# Patient Record
Sex: FEMALE | Race: WHITE | NOT HISPANIC OR LATINO | Employment: OTHER | URBAN - METROPOLITAN AREA
[De-identification: names, ages, dates, MRNs, and addresses within clinical notes are randomized per-mention and may not be internally consistent; named-entity substitution may affect disease eponyms.]

---

## 2019-07-01 ENCOUNTER — TELEPHONE (OUTPATIENT)
Dept: GASTROENTEROLOGY | Facility: CLINIC | Age: 19
End: 2019-07-01

## 2019-07-01 NOTE — TELEPHONE ENCOUNTER
----- Message from Caren Gonzalez sent at 7/1/2019  9:12 AM CDT -----  Contact: Mom- Darline- [phone]  Pts mom called to schedule a np appt- states the pts feeding tube is leaking and needs to be changed- states her tube was placed by someone at [facility] in 2002- please contact Darline at [phone]

## 2019-07-19 ENCOUNTER — OFFICE VISIT (OUTPATIENT)
Dept: GASTROENTEROLOGY | Facility: CLINIC | Age: 19
End: 2019-07-19
Payer: COMMERCIAL

## 2019-07-19 VITALS
WEIGHT: 60 LBS | HEART RATE: 100 BPM | BODY MASS INDEX: 13.89 KG/M2 | HEIGHT: 55 IN | DIASTOLIC BLOOD PRESSURE: 73 MMHG | SYSTOLIC BLOOD PRESSURE: 114 MMHG

## 2019-07-19 DIAGNOSIS — Z93.1 PEG (PERCUTANEOUS ENDOSCOPIC GASTROSTOMY) STATUS: Primary | ICD-10-CM

## 2019-07-19 PROCEDURE — 3008F PR BODY MASS INDEX (BMI) DOCUMENTED: ICD-10-PCS | Mod: CPTII,S$GLB,, | Performed by: INTERNAL MEDICINE

## 2019-07-19 PROCEDURE — 99999 PR PBB SHADOW E&M-EST. PATIENT-LVL III: CPT | Mod: PBBFAC,,, | Performed by: INTERNAL MEDICINE

## 2019-07-19 PROCEDURE — 3008F BODY MASS INDEX DOCD: CPT | Mod: CPTII,S$GLB,, | Performed by: INTERNAL MEDICINE

## 2019-07-19 PROCEDURE — 99202 PR OFFICE/OUTPT VISIT, NEW, LEVL II, 15-29 MIN: ICD-10-PCS | Mod: S$GLB,,, | Performed by: INTERNAL MEDICINE

## 2019-07-19 PROCEDURE — 99202 OFFICE O/P NEW SF 15 MIN: CPT | Mod: S$GLB,,, | Performed by: INTERNAL MEDICINE

## 2019-07-19 PROCEDURE — 99999 PR PBB SHADOW E&M-EST. PATIENT-LVL III: ICD-10-PCS | Mod: PBBFAC,,, | Performed by: INTERNAL MEDICINE

## 2019-07-19 RX ORDER — BACLOFEN 10 MG/1
10 TABLET ORAL 4 TIMES DAILY
COMMUNITY
Start: 2019-05-14

## 2019-07-19 RX ORDER — CEPHALEXIN 250 MG/5ML
250 POWDER, FOR SUSPENSION ORAL EVERY 6 HOURS
Status: CANCELLED | OUTPATIENT
Start: 2019-07-19

## 2019-07-19 RX ORDER — LAMOTRIGINE 150 MG/1
TABLET ORAL 2 TIMES DAILY
COMMUNITY
Start: 2019-05-14
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
## 2021-03-11 ENCOUNTER — IMMUNIZATION (OUTPATIENT)
Dept: OBSTETRICS AND GYNECOLOGY | Facility: CLINIC | Age: 21
End: 2021-03-11
Payer: COMMERCIAL

## 2021-03-11 DIAGNOSIS — Z23 NEED FOR VACCINATION: Primary | ICD-10-CM

## 2021-03-11 PROCEDURE — 91300 COVID-19, MRNA, LNP-S, PF, 30 MCG/0.3 ML DOSE VACCINE: ICD-10-PCS | Mod: ,,, | Performed by: ANESTHESIOLOGY

## 2021-03-11 PROCEDURE — 0001A COVID-19, MRNA, LNP-S, PF, 30 MCG/0.3 ML DOSE VACCINE: ICD-10-PCS | Mod: CV19,,, | Performed by: ANESTHESIOLOGY

## 2021-03-11 PROCEDURE — 91300 COVID-19, MRNA, LNP-S, PF, 30 MCG/0.3 ML DOSE VACCINE: CPT | Mod: ,,, | Performed by: ANESTHESIOLOGY

## 2021-03-11 PROCEDURE — 0001A COVID-19, MRNA, LNP-S, PF, 30 MCG/0.3 ML DOSE VACCINE: CPT | Mod: CV19,,, | Performed by: ANESTHESIOLOGY

## 2021-04-01 ENCOUNTER — IMMUNIZATION (OUTPATIENT)
Dept: OBSTETRICS AND GYNECOLOGY | Facility: CLINIC | Age: 21
End: 2021-04-01
Payer: COMMERCIAL

## 2021-04-01 DIAGNOSIS — Z23 NEED FOR VACCINATION: Primary | ICD-10-CM

## 2021-04-01 PROCEDURE — 91300 COVID-19, MRNA, LNP-S, PF, 30 MCG/0.3 ML DOSE VACCINE: CPT | Mod: ,,, | Performed by: ANESTHESIOLOGY

## 2021-04-01 PROCEDURE — 0002A COVID-19, MRNA, LNP-S, PF, 30 MCG/0.3 ML DOSE VACCINE: ICD-10-PCS | Mod: CV19,,, | Performed by: ANESTHESIOLOGY

## 2021-04-01 PROCEDURE — 91300 COVID-19, MRNA, LNP-S, PF, 30 MCG/0.3 ML DOSE VACCINE: ICD-10-PCS | Mod: ,,, | Performed by: ANESTHESIOLOGY

## 2021-04-01 PROCEDURE — 0002A COVID-19, MRNA, LNP-S, PF, 30 MCG/0.3 ML DOSE VACCINE: CPT | Mod: CV19,,, | Performed by: ANESTHESIOLOGY

## 2021-12-10 ENCOUNTER — IMMUNIZATION (OUTPATIENT)
Dept: OBSTETRICS AND GYNECOLOGY | Facility: CLINIC | Age: 21
End: 2021-12-10
Payer: MEDICAID

## 2021-12-10 DIAGNOSIS — Z23 NEED FOR VACCINATION: Primary | ICD-10-CM

## 2021-12-10 PROCEDURE — 0004A COVID-19, MRNA, LNP-S, PF, 30 MCG/0.3 ML DOSE VACCINE: CPT | Mod: PBBFAC

## 2023-12-20 ENCOUNTER — HOSPITAL ENCOUNTER (EMERGENCY)
Facility: HOSPITAL | Age: 23
Discharge: HOME | End: 2023-12-20
Attending: EMERGENCY MEDICINE
Payer: MEDICAID

## 2023-12-20 VITALS
RESPIRATION RATE: 20 BRPM | TEMPERATURE: 98 F | SYSTOLIC BLOOD PRESSURE: 125 MMHG | DIASTOLIC BLOOD PRESSURE: 91 MMHG | BODY MASS INDEX: 18.31 KG/M2 | OXYGEN SATURATION: 97 % | WEIGHT: 60 LBS | HEART RATE: 93 BPM

## 2023-12-20 DIAGNOSIS — R50.9 FEVER: ICD-10-CM

## 2023-12-20 DIAGNOSIS — J40 BRONCHITIS: Primary | ICD-10-CM

## 2023-12-20 LAB
CTP QC/QA: YES
CTP QC/QA: YES
POC MOLECULAR INFLUENZA A AGN: NEGATIVE
POC MOLECULAR INFLUENZA B AGN: NEGATIVE
SARS-COV-2 RDRP RESP QL NAA+PROBE: NEGATIVE

## 2023-12-20 PROCEDURE — 87502 INFLUENZA DNA AMP PROBE: CPT

## 2023-12-20 PROCEDURE — 99900035 HC TECH TIME PER 15 MIN (STAT)

## 2023-12-20 PROCEDURE — 25000242 PHARM REV CODE 250 ALT 637 W/ HCPCS: Performed by: EMERGENCY MEDICINE

## 2023-12-20 PROCEDURE — 99900026 HC AIRWAY MAINTENANCE (STAT)

## 2023-12-20 PROCEDURE — 99900031 HC PATIENT EDUCATION (STAT)

## 2023-12-20 PROCEDURE — 99284 EMERGENCY DEPT VISIT MOD MDM: CPT | Mod: 25

## 2023-12-20 PROCEDURE — 87635 SARS-COV-2 COVID-19 AMP PRB: CPT | Performed by: CLINICAL NURSE SPECIALIST

## 2023-12-20 PROCEDURE — 94640 AIRWAY INHALATION TREATMENT: CPT | Mod: XB

## 2023-12-20 RX ORDER — IPRATROPIUM BROMIDE AND ALBUTEROL SULFATE 2.5; .5 MG/3ML; MG/3ML
3 SOLUTION RESPIRATORY (INHALATION) EVERY 6 HOURS PRN
Qty: 75 ML | Refills: 4 | Status: SHIPPED | OUTPATIENT
Start: 2023-12-20 | End: 2024-12-19

## 2023-12-20 RX ORDER — AMOXICILLIN AND CLAVULANATE POTASSIUM 400; 57 MG/5ML; MG/5ML
800 POWDER, FOR SUSPENSION ORAL EVERY 12 HOURS
Qty: 140 ML | Refills: 0 | Status: SHIPPED | OUTPATIENT
Start: 2023-12-20 | End: 2023-12-27

## 2023-12-20 RX ORDER — IPRATROPIUM BROMIDE AND ALBUTEROL SULFATE 2.5; .5 MG/3ML; MG/3ML
3 SOLUTION RESPIRATORY (INHALATION)
Status: COMPLETED | OUTPATIENT
Start: 2023-12-20 | End: 2023-12-20

## 2023-12-20 RX ADMIN — IPRATROPIUM BROMIDE AND ALBUTEROL SULFATE 3 ML: 2.5; .5 SOLUTION RESPIRATORY (INHALATION) at 05:12

## 2023-12-20 RX ADMIN — IPRATROPIUM BROMIDE AND ALBUTEROL SULFATE 3 ML: 2.5; .5 SOLUTION RESPIRATORY (INHALATION) at 06:12

## 2023-12-20 NOTE — DISCHARGE INSTRUCTIONS
Flu, COVID, x-ray negative.  Can alternate Tylenol Motrin as needed for temperature greater than 100.4

## 2023-12-20 NOTE — ED PROVIDER NOTES
Encounter Date: 12/20/2023       History     Chief Complaint   Patient presents with    Fever     Mother stated that pt has been experiencing fever with decreased SPO2 for the past few days. Seen by PCP and referred to ED with concerns for pneumonia.      23-year-old female presents to emergency room with fever, decreased oxygen level at home for the last few days.  Mom called PCP and was referred to [facility] emergency room for chest x-ray and swab.  Patient has a history of cerebral palsy wheelchair-bound.  Also has a trach and a feeding tube.        Review of patient's allergies indicates:  No Known Allergies  Past Medical History:   Diagnosis Date    CP (cerebral palsy)      No past surgical history on file.  Family History   Problem Relation Age of Onset    Colon cancer Neg Hx     Esophageal cancer Neg Hx      Social History     Tobacco Use    Smoking status: Never    Smokeless tobacco: Never   Substance Use Topics    Alcohol use: Never    Drug use: Never     Review of Systems   Constitutional:  Positive for fever.   HENT:  Negative for sore throat.    Respiratory:  Negative for shortness of breath.    Cardiovascular:  Negative for chest pain.   Gastrointestinal:  Negative for nausea.   Genitourinary:  Negative for dysuria.   Musculoskeletal:  Negative for back pain.   Skin:  Negative for rash.   Neurological:  Negative for weakness.   Hematological:  Does not bruise/bleed easily.   All other systems reviewed and are negative.      Physical Exam     Initial Vitals   BP Pulse Resp Temp SpO2   12/20/23 1559 12/20/23 1559 12/20/23 1559 12/20/23 1557 12/20/23 1559   (!) 125/91 81 20 98.1 °F (36.7 °C) (!) 91 %      MAP       --                Physical Exam    Nursing note and vitals reviewed.  Constitutional: She appears well-nourished.   HENT:   Head: Normocephalic and atraumatic.   Eyes: Pupils are equal, round, and reactive to light.   Neck:   Normal range of motion.  Musculoskeletal:      Cervical back: Normal range  of motion.      Comments: Patient is crack contracted to left arm.  Patient has bilateral footdrop           ED Course   Procedures  Labs Reviewed   SARS-COV-2 RDRP GENE    Narrative:     .r   POCT INFLUENZA A/B MOLECULAR          Imaging Results              X-Ray Chest AP Portable (In process)                      Medications - No data to display  Medical Decision Making  Amount and/or Complexity of Data Reviewed  Labs: ordered.  Radiology: ordered.                                      Clinical Impression:  Final diagnoses:  [R50.9] Fever          ED Disposition Condition    Discharge Stable          ED Prescriptions    None       Follow-up Information       Follow up With Specialties Details Why Contact Info    Dennys Newell MD Pediatrics  As needed [address] [place] [id number]               Madhavi Porras, NP  12/20/23 0716

## 2024-07-08 ENCOUNTER — HOSPITAL ENCOUNTER (EMERGENCY)
Facility: HOSPITAL | Age: 24
Discharge: HOME | End: 2024-07-08
Attending: EMERGENCY MEDICINE
Payer: MEDICAID

## 2024-07-08 VITALS
OXYGEN SATURATION: 95 % | TEMPERATURE: 99 F | SYSTOLIC BLOOD PRESSURE: 113 MMHG | HEART RATE: 112 BPM | RESPIRATION RATE: 24 BRPM | BODY MASS INDEX: 18.31 KG/M2 | DIASTOLIC BLOOD PRESSURE: 81 MMHG | WEIGHT: 60 LBS

## 2024-07-08 DIAGNOSIS — J20.4 PARAINFLUENZA VIRUS BRONCHITIS: Primary | ICD-10-CM

## 2024-07-08 DIAGNOSIS — R06.02 SOB (SHORTNESS OF BREATH): ICD-10-CM

## 2024-07-08 LAB
ADENOVIRUS: NOT DETECTED
BORDETELLA PARAPERTUSSIS (IS1001): NOT DETECTED
BORDETELLA PERTUSSIS (PTXP): NOT DETECTED
CHLAMYDIA PNEUMONIAE: NOT DETECTED
CORONAVIRUS 229E, COMMON COLD VIRUS: NOT DETECTED
CORONAVIRUS HKU1, COMMON COLD VIRUS: NOT DETECTED
CORONAVIRUS NL63, COMMON COLD VIRUS: NOT DETECTED
CORONAVIRUS OC43, COMMON COLD VIRUS: NOT DETECTED
FLUBV RNA NPH QL NAA+NON-PROBE: NOT DETECTED
HPIV1 RNA NPH QL NAA+NON-PROBE: NOT DETECTED
HPIV2 RNA NPH QL NAA+NON-PROBE: NOT DETECTED
HPIV3 RNA NPH QL NAA+NON-PROBE: NOT DETECTED
HPIV4 RNA NPH QL NAA+NON-PROBE: DETECTED
HUMAN METAPNEUMOVIRUS: NOT DETECTED
INFLUENZA A (SUBTYPES H1,H1-2009,H3): NOT DETECTED
MYCOPLASMA PNEUMONIAE: NOT DETECTED
RESPIRATORY INFECTION PANEL SOURCE: ABNORMAL
RSV RNA NPH QL NAA+NON-PROBE: NOT DETECTED
RV+EV RNA NPH QL NAA+NON-PROBE: NOT DETECTED
SARS-COV-2 RNA RESP QL NAA+PROBE: NOT DETECTED

## 2024-07-08 PROCEDURE — 99285 EMERGENCY DEPT VISIT HI MDM: CPT | Mod: 25

## 2024-07-08 PROCEDURE — 99900035 HC TECH TIME PER 15 MIN (STAT)

## 2024-07-08 PROCEDURE — 25000242 PHARM REV CODE 250 ALT 637 W/ HCPCS: Performed by: CLINICAL NURSE SPECIALIST

## 2024-07-08 PROCEDURE — 87581 M.PNEUMON DNA AMP PROBE: CPT | Performed by: CLINICAL NURSE SPECIALIST

## 2024-07-08 PROCEDURE — 94761 N-INVAS EAR/PLS OXIMETRY MLT: CPT

## 2024-07-08 PROCEDURE — 87798 DETECT AGENT NOS DNA AMP: CPT | Performed by: CLINICAL NURSE SPECIALIST

## 2024-07-08 PROCEDURE — 94640 AIRWAY INHALATION TREATMENT: CPT

## 2024-07-08 PROCEDURE — 99900026 HC AIRWAY MAINTENANCE (STAT)

## 2024-07-08 RX ORDER — PREDNISOLONE 15 MG/5ML
1 SOLUTION ORAL DAILY
Qty: 45.5 ML | Refills: 0 | Status: SHIPPED | OUTPATIENT
Start: 2024-07-08 | End: 2024-07-13

## 2024-07-08 RX ORDER — IPRATROPIUM BROMIDE AND ALBUTEROL SULFATE 2.5; .5 MG/3ML; MG/3ML
3 SOLUTION RESPIRATORY (INHALATION)
Status: COMPLETED | OUTPATIENT
Start: 2024-07-08 | End: 2024-07-08

## 2024-07-08 RX ORDER — AMOXICILLIN AND CLAVULANATE POTASSIUM 400; 57 MG/5ML; MG/5ML
25 POWDER, FOR SUSPENSION ORAL 2 TIMES DAILY
Qty: 60 ML | Refills: 0 | Status: SHIPPED | OUTPATIENT
Start: 2024-07-08 | End: 2024-07-15

## 2024-07-08 RX ADMIN — IPRATROPIUM BROMIDE AND ALBUTEROL SULFATE 3 ML: 2.5; .5 SOLUTION RESPIRATORY (INHALATION) at 02:07

## 2024-07-08 NOTE — ED PROVIDER NOTES
Encounter Date: 7/8/2024       History     Chief Complaint   Patient presents with    Cough     Pt sent by Dr. Winter for coughing. Trach pt with the need for suctioning more recently.      24-year-old female presents emergency room with coughing, wet sounding, having to be suctioned via trach more often.  Was sent from her PCP office to the emergency room for evaluation.  History of cerebral palsy        Review of patient's allergies indicates:  No Known Allergies  Past Medical History:   Diagnosis Date    CP (cerebral palsy)      History reviewed. No pertinent surgical history.  Family History   Problem Relation Name Age of Onset    Colon cancer Neg Hx      Esophageal cancer Neg Hx       Social History     Tobacco Use    Smoking status: Never    Smokeless tobacco: Never   Substance Use Topics    Alcohol use: Never    Drug use: Never     Review of Systems   Constitutional:  Negative for fever.   HENT:  Negative for sore throat.    Respiratory:  Positive for cough and wheezing. Negative for shortness of breath.    Cardiovascular:  Negative for chest pain.   Gastrointestinal:  Negative for nausea.   Genitourinary:  Negative for dysuria.   Musculoskeletal:  Positive for gait problem. Negative for back pain.   Skin:  Negative for rash.   Neurological:  Negative for weakness.   Hematological:  Does not bruise/bleed easily.   All other systems reviewed and are negative.      Physical Exam     Initial Vitals [07/08/24 1416]   BP Pulse Resp Temp SpO2   113/81 104 (!) 25 98.7 °F (37.1 °C) (!) 89 %      MAP       --         Physical Exam    Nursing note and vitals reviewed.  Constitutional:   Aphasic   HENT:   Head: Normocephalic and atraumatic.   Eyes: Pupils are equal, round, and reactive to light.   Neck: A tracheostomy is present - with a tracheostomy tube.   Cardiovascular:  Normal rate and regular rhythm.           Pulmonary/Chest: She has wheezes. She has rhonchi.   Abdominal: Abdomen is soft. Bowel sounds are  normal.   PEG tube in place   Musculoskeletal:         General: Normal range of motion.      Comments: Bilateral contractures to arms.  Bilateral foot drop.  Wheelchair-bound     Neurological: She is alert and oriented to person, place, and time.   Skin: Skin is warm and dry.   Psychiatric: She has a normal mood and affect.         ED Course   Procedures  Labs Reviewed   RESPIRATORY INFECTION PANEL (PCR), NASOPHARYNGEAL - Abnormal; Notable for the following components:       Result Value    Parainfluenza Virus 4 Detected (*)     All other components within normal limits    Narrative:     Assay not valid for lower respiratory specimens, alternate  testing required.  Respiratory Infection Panel source->NP Swab          Imaging Results              X-Ray Chest AP Portable (Final result)  Result time 07/08/24 17:25:06      Final result by Kaela Khan MD (07/08/24 17:25:06)                   Impression:      Possible left perihilar infiltrate versus artifact from overlapping bronchovascular structures      Electronically signed by: Kaela Khan MD  Date:    07/08/2024  Time:    17:25               Narrative:    EXAMINATION:  XR CHEST AP PORTABLE    CLINICAL HISTORY:  Shortness of breath    COMPARISON:  12/20/2023    FINDINGS:  Scoliosis and mild patient rotation.  Tracheostomy device present.  Positioning limits evaluation of the left hilum, were mild infiltrate is possible.  Otherwise, lungs appear clear.  No pleural fluid or pneumothorax.                                       Medications   albuterol-ipratropium 2.5 mg-0.5 mg/3 mL nebulizer solution 3 mL (3 mLs Nebulization Given 7/8/24 1456)     Medical Decision Making  Amount and/or Complexity of Data Reviewed  Radiology: ordered.    Risk  Prescription drug management.               ED Course as of 07/08/24 1732   Mon Jul 08, 2024   1531 Chest x-ray reviewed by Dr. Gunn, negative [YAMILET]      ED Course User Index  [YAMILET] Madhavi Porras, ABDIRASHID                            Clinical Impression:  Final diagnoses:  [R06.02] SOB (shortness of breath)  [J20.4] Parainfluenza virus bronchitis (Primary)          ED Disposition Condition    Discharge Stable          ED Prescriptions       Medication Sig Dispense Start Date End Date Auth. Provider    amoxicillin-clavulanate (AUGMENTIN) 400-57 mg/5 mL SusR Take 4.3 mLs (344 mg total) by mouth 2 (two) times daily. for 7 days 60 mL 7/8/2024 7/15/2024 Madhavi Porras NP    prednisoLONE (PRELONE) 15 mg/5 mL syrup Take 9.1 mLs (27.3 mg total) by mouth once daily. for 5 days 45.5 mL 7/8/2024 7/13/2024 Madhavi Porras NP          Follow-up Information       Follow up With Specialties Details Why Contact Info    Dennys Newell MD Pediatrics  As needed [address]  [place] [address]               Madhavi Porras NP  07/08/24 6475